# Patient Record
Sex: FEMALE | Race: WHITE | NOT HISPANIC OR LATINO | Employment: UNEMPLOYED | URBAN - METROPOLITAN AREA
[De-identification: names, ages, dates, MRNs, and addresses within clinical notes are randomized per-mention and may not be internally consistent; named-entity substitution may affect disease eponyms.]

---

## 2017-03-26 ENCOUNTER — HOSPITAL ENCOUNTER (EMERGENCY)
Facility: HOSPITAL | Age: 37
Discharge: HOME | End: 2017-03-26
Attending: EMERGENCY MEDICINE | Admitting: EMERGENCY MEDICINE

## 2017-03-26 VITALS
RESPIRATION RATE: 18 BRPM | DIASTOLIC BLOOD PRESSURE: 97 MMHG | OXYGEN SATURATION: 99 % | HEART RATE: 83 BPM | WEIGHT: 160 LBS | SYSTOLIC BLOOD PRESSURE: 136 MMHG | BODY MASS INDEX: 23.7 KG/M2 | HEIGHT: 69 IN | TEMPERATURE: 98.4 F

## 2017-03-26 DIAGNOSIS — S61.219A FINGER LACERATION, INITIAL ENCOUNTER: Primary | ICD-10-CM

## 2017-03-26 PROCEDURE — 90715 TDAP VACCINE 7 YRS/> IM: CPT | Performed by: EMERGENCY MEDICINE

## 2017-03-26 PROCEDURE — 90471 IMMUNIZATION ADMIN: CPT | Performed by: EMERGENCY MEDICINE

## 2017-03-26 PROCEDURE — 99283 EMERGENCY DEPT VISIT LOW MDM: CPT

## 2017-03-26 PROCEDURE — 25010000002 TDAP 5-2.5-18.5 LF-MCG/0.5 SUSPENSION: Performed by: EMERGENCY MEDICINE

## 2017-03-26 RX ORDER — BACITRACIN, NEOMYCIN, POLYMYXIN B 400; 3.5; 5 [USP'U]/G; MG/G; [USP'U]/G
OINTMENT TOPICAL
Status: COMPLETED
Start: 2017-03-26 | End: 2017-03-26

## 2017-03-26 RX ORDER — LIDOCAINE HYDROCHLORIDE 10 MG/ML
5 INJECTION, SOLUTION INFILTRATION; PERINEURAL ONCE
Status: COMPLETED | OUTPATIENT
Start: 2017-03-26 | End: 2017-03-26

## 2017-03-26 RX ORDER — BACITRACIN, NEOMYCIN, POLYMYXIN B 400; 3.5; 5 [USP'U]/G; MG/G; [USP'U]/G
1 OINTMENT TOPICAL ONCE
Status: COMPLETED | OUTPATIENT
Start: 2017-03-26 | End: 2017-03-26

## 2017-03-26 RX ORDER — LIDOCAINE HYDROCHLORIDE 10 MG/ML
INJECTION, SOLUTION EPIDURAL; INFILTRATION; INTRACAUDAL; PERINEURAL
Status: COMPLETED
Start: 2017-03-26 | End: 2017-03-26

## 2017-03-26 RX ADMIN — BACITRACIN, NEOMYCIN, POLYMYXIN B 1 APPLICATION: 400; 3.5; 5 OINTMENT TOPICAL at 12:05

## 2017-03-26 RX ADMIN — LIDOCAINE HYDROCHLORIDE 5 ML: 10 INJECTION, SOLUTION EPIDURAL; INFILTRATION; INTRACAUDAL; PERINEURAL at 12:17

## 2017-03-26 RX ADMIN — LIDOCAINE HYDROCHLORIDE 5 ML: 10 INJECTION, SOLUTION INFILTRATION; PERINEURAL at 12:17

## 2017-03-26 RX ADMIN — TETANUS TOXOID, REDUCED DIPHTHERIA TOXOID AND ACELLULAR PERTUSSIS VACCINE, ADSORBED 0.5 ML: 5; 2.5; 8; 8; 2.5 SUSPENSION INTRAMUSCULAR at 11:20

## 2017-03-26 NOTE — ED PROVIDER NOTES
Subjective   Patient is a 37 y.o. female presenting with skin laceration.   History provided by:  Patient  Laceration   Location:  Finger  Length:  1-2 cm  Quality comment:  Curved   Time since incident: PTA.  Injury mechanism: Carrot slicer   Tetanus status:  Unknown  Associated symptoms: no focal weakness and no numbness    Pt has full movement of finger with no difficulty. No nail involvement.     Review of Systems   Skin: Positive for wound (finger laceration ).   Neurological: Negative for focal weakness, weakness and numbness.   All other systems reviewed and are negative.      History reviewed. No pertinent past medical history.    No Known Allergies    Past Surgical History:   Procedure Laterality Date   •  SECTION         History reviewed. No pertinent family history.    Social History     Social History   • Marital status:      Spouse name: N/A   • Number of children: N/A   • Years of education: N/A     Social History Main Topics   • Smoking status: Never Smoker   • Smokeless tobacco: None   • Alcohol use No   • Drug use: No   • Sexual activity: Defer     Other Topics Concern   • None     Social History Narrative   • None           Objective   Physical Exam   Constitutional: She appears well-developed. No distress.   HENT:   Head: Atraumatic.   Eyes: Conjunctivae are normal.   Cardiovascular: Normal rate.    Pulmonary/Chest: Effort normal.   Musculoskeletal: Normal range of motion.        Left hand: She exhibits laceration (Distal left middle finger, montelongo aspect, 1.5 cm, curved. bleeding controlled, no foreign bodies. ). She exhibits normal range of motion and normal capillary refill. Normal sensation noted. Normal strength noted.   Neurological: She is alert.   Skin: Skin is warm.   Psychiatric: She has a normal mood and affect.   Nursing note and vitals reviewed.      Laceration Repair  Date/Time: 3/26/2017 1:46 PM  Performed by: ALEXA GAUTAM  Authorized by: AVINASH MINAYA   Consent:  "Verbal consent obtained.  Risks and benefits: risks, benefits and alternatives were discussed  Consent given by: patient  Body area: upper extremity  Location details: left long finger  Laceration length: 1.5 cm  Foreign bodies: no foreign bodies  Anesthesia: local infiltration    Anesthesia:  Anesthesia: local infiltration  Local Anesthetic: lidocaine 1% without epinephrine   Preparation: Patient was prepped and draped in the usual sterile fashion.  Irrigation solution: saline  Amount of cleaning: standard  Skin closure: 4-0 nylon  Number of sutures: 3  Technique: simple  Approximation: close  Approximation difficulty: simple  Dressing: antibiotic ointment  Patient tolerance: Patient tolerated the procedure well with no immediate complications               ED Course  ED Course   Discussed treatment plan and patient is agreeable.      No results found for this or any previous visit (from the past 24 hour(s)).  Note: In addition to lab results from this visit, the labs listed above may include labs taken at another facility or during a different encounter within the last 24 hours. Please correlate lab times with ED admission and discharge times for further clarification of the services performed during this visit.    No orders to display     Vitals:    03/26/17 1110 03/26/17 1111 03/26/17 1216   BP:  147/98 136/97   Pulse:  120 83   Resp:  18 18   Temp: 98.3 °F (36.8 °C)  98.4 °F (36.9 °C)   TempSrc: Oral  Oral   SpO2:  100% 99%   Weight: 160 lb (72.6 kg)     Height: 69\" (175.3 cm)       Medications   Tdap (BOOSTRIX) injection 0.5 mL (0.5 mL Intramuscular Given 3/26/17 1120)   lidocaine (XYLOCAINE) 1 % injection 5 mL (5 mL Injection Given 3/26/17 1217)   neomycin-bacitracin-polymyxin (NEOSPORIN) ointment 1 application (1 application Topical Given 3/26/17 1205)                      MDM    Final diagnoses:   Finger laceration, initial encounter            SANTO Perez  03/26/17 1734    "

## 2025-05-16 ENCOUNTER — HOSPITAL ENCOUNTER (OUTPATIENT)
Dept: RADIOLOGY | Facility: CLINIC | Age: 45
End: 2025-05-16
Payer: COMMERCIAL

## 2025-07-10 ENCOUNTER — HOSPITAL ENCOUNTER (OUTPATIENT)
Dept: RADIOLOGY | Facility: CLINIC | Age: 45
End: 2025-07-10
Payer: COMMERCIAL

## 2025-07-14 ENCOUNTER — HOSPITAL ENCOUNTER (OUTPATIENT)
Dept: RADIOLOGY | Facility: CLINIC | Age: 45
Discharge: HOME | End: 2025-07-14
Payer: COMMERCIAL

## 2025-07-14 DIAGNOSIS — Z13.6 ENCOUNTER FOR SCREENING FOR CARDIOVASCULAR DISORDERS: ICD-10-CM

## 2025-07-14 PROCEDURE — 75571 CT HRT W/O DYE W/CA TEST: CPT | Mod: LIO
